# Patient Record
(demographics unavailable — no encounter records)

---

## 2020-01-01 NOTE — DIS
DATE OF ADMISSION:  2020



DATE OF DISCHARGE:  2020



RESIDENT:  Gurmeet Easley MD



DISCHARGE DIAGNOSES:  

1. TAGA male born at 39.1 weeks.

2. Positive family history none.  Maternal history of obesity and gestational

pruritus. 

3. Primary lower transverse  after failure of progression with vacuum

assisted delivery. 



PROCEDURES:  None.



HISTORY OF PRESENT ILLNESS:  Baby boy, Kay Duarte presented at 39.1 weeks to a

29-year-old, G1, P0, blood type B positive, GBS negative, hep B negative, HIV

negative, RPR negative, rubella negative.  The family history was noncontributory.

The maternal history is positive for the obesity and gestational pruritus pregnancy

with uncomplicated primary lower transverse  delivery was accomplished at

2227 hours on  by Dr. Mccullough.  No resuscitation was needed.  Apgars were 8 and 9

at 1 and 5 minutes respectively.  Physical exam; weight was 3.465 kg at birth,

length was 20.67 inches, and head circumference was 35.5 cm.  The physical exam was

remarkable for cephalhematoma, that was slowly resolving.  The infant experienced an

unremarkable hospital course, established feedings well, voided and stooled

normally.  Bilirubin was slightly elevated at 36 hours to 9.7.  This would be high

intermediate risk and needed followup due to his risk factors of cephalhematoma and

a 60 hours bilirubin was 13.9 and which is high intermediate risk and recommended

followup in 24 hours.  Bilirubin script was written for patient to return to [facility] on 2020 for bilirubin recheck.  The patient was __________. 



DISPOSITION:  Discharge to home on 2020 with discharge weight of 3.232 kg.

__________.  Blood type B positive, Tisha negative.  There was ABO compatibility, 

but no signs of hemolysis.  Hearing screen was passed on 2020.  Hep B was

given on 2020.  Discharge bilirubin was 13.9 on 2020 at 60 hours of life 

placing infant at high intermediate risk.  Follow up with Dr. Loyola on 2020,

and return to [facility] on 2020 for repeat bilirubin check. 







Job ID:  [id number]